# Patient Record
Sex: MALE | URBAN - METROPOLITAN AREA
[De-identification: names, ages, dates, MRNs, and addresses within clinical notes are randomized per-mention and may not be internally consistent; named-entity substitution may affect disease eponyms.]

---

## 2022-03-08 ENCOUNTER — APPOINTMENT (OUTPATIENT)
Dept: URBAN - METROPOLITAN AREA CLINIC 309 | Age: 62
Setting detail: DERMATOLOGY
End: 2022-03-08

## 2022-03-08 DIAGNOSIS — L30.9 DERMATITIS, UNSPECIFIED: ICD-10-CM

## 2022-03-08 PROCEDURE — OTHER COUNSELING: OTHER

## 2022-03-08 PROCEDURE — OTHER MIPS QUALITY: OTHER

## 2022-03-08 PROCEDURE — 99202 OFFICE O/P NEW SF 15 MIN: CPT

## 2022-03-08 PROCEDURE — OTHER ADDITIONAL NOTES: OTHER

## 2022-03-08 PROCEDURE — OTHER PRESCRIPTION: OTHER

## 2022-03-08 PROCEDURE — OTHER ORDER TESTS: OTHER

## 2022-03-08 RX ORDER — CLOBETASOL PROPIONATE 0.5 MG/G
CREAM TOPICAL BID
Qty: 60 | Refills: 1 | Status: ERX | COMMUNITY
Start: 2022-03-08

## 2022-03-08 NOTE — PROCEDURE: ADDITIONAL NOTES
Additional Notes: Recommended free&clear products.  Recommended getting Dermele[employer] and provided samples.
Render Risk Assessment In Note?: no
Detail Level: Simple